# Patient Record
(demographics unavailable — no encounter records)

---

## 2024-12-17 NOTE — PLAN
[TextEntry] : 1. Blood work will be placed in the system and emailed to Cristobal for completion. 2. Cristobal will decrease his protein intake to 65-70g/day in the first week of January prior to blood work being completed a few weeks later. 3. Ophthalmologist appointed to be scheduled by Cristobal for a dilated eye exam. 4. Cardiology appointment to be scheduled by Cristobal 5. Follow-up in 6 months

## 2024-12-17 NOTE — HISTORY OF PRESENT ILLNESS
[FreeTextEntry1] : Cristobal is a 27-year-old male with methylenetetrahydrofolate (MTHFR) deficiency, autonomic postural hypotension and a history of Paroxysmal atrial fibrillation presenting to the office for a follow-up visit. He was last seen by Dr. Saldivar on 12/20/2023.   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   MRI scan of the brain was normal, ruling out white matter disease occasionally described in late onset MTHFR deficiency.    Today: Cristobal is presenting for a follow-up visit.   Current: Cristobal reports that he has not had any medical emergencies or ED visits since last seen in 12/2023. He presented to a urgicenter about 1 month ago with sores and small abrasions to his tongue without knowledge of biting his tongue. He decreased the amount of time that he had been spending in the gym lately. Cristobal reports a high protein diet with red meat, chicken and fish. 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  Cristobal will try to decrease his protein amount and repeat the blood work to see if any changes are noted.  We also discussed visiting an opthalmalogist (dialated exam) and visiting the cardiologist again. Diet: Ben Weil, RD completed a diet recall at this visit  Med Hx: Cristobal was started on Betaine in 5/2016. He was seen for a 2nd opinion by a metabolic  at [facility], Dr. Tse. They started Cristobal on Vitamin B12 and folinic acid. Dr. Ragsdale told the parents that folinic acid is not effective in this condition and had the parents order 5-methyltetrahydrofolate and start Cristobal on 5 mg bid.     Current Medications: as of 12/20/23 -Aspirin 81mg - Betaine 6g BID - Multi vit QD   Homocysteine levels historically: Ref. range: <=15 umol/L  09/20/2021- 158.6  02/23/2022- 157.7  12/23/2023- 103.6  10/12/2024- 97.6   Methylmalonic Acid: 09/20/2021- 79nmol/L  (ref. range )  Specialist:  Cardiologist: Dr. Radha Vasquez, ([facility]), for atrial fibrillation. He had a stress test which was normal. about 2 years ago (2022) Ophthalmologist: examined for dry eyes without dilation 11/2024  Hospitalizations: 2/2016- Synchronized cardioversion was required to resolve his atrial fibrillation.  Dr. Saldivar encouraged questions and fully answered them.   She discussed the option of hydroxycobalamin injections and ways to put emergency medical information on Cristobal's phone.

## 2024-12-17 NOTE — REASON FOR VISIT
[Follow-Up] : [unfilled]  is being seen for  ~M a follow-up visit [Other: _____] : [unfilled] [FreeTextEntry3] : Cristobal is presenting for a follow-up for MTHFR deficiency

## 2024-12-17 NOTE — HISTORY OF PRESENT ILLNESS
[FreeTextEntry1] : Cristobal is a 27-year-old male with methylenetetrahydrofolate (MTHFR) deficiency, autonomic postural hypotension and a history of Paroxysmal atrial fibrillation presenting to the office for a follow-up visit. He was last seen by Dr. Saldivar on 12/20/2023.   Genetic Testing: The MTHFR gene sequencing and del/dup analysis showed that Cristobal is compound heterozygous for a pathogenic variant (c.1262G>T; p.[id number]) and a variant of uncertain significance (c.1099T>C; p.[id number]).   MRI scan of the brain was normal, ruling out white matter disease occasionally described in late onset MTHFR deficiency.    Today: Cristobal is presenting for a follow-up visit.   Current: Cristobal reports that he has not had any medical emergencies or ED visits since last seen in 12/2023. He presented to a urgicenter about 1 month ago with sores and small abrasions to his tongue without knowledge of biting his tongue. He decreased the amount of time that he had been spending in the gym lately. Cristobal reports a high protein diet with red meat, chicken and fish. 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  Cristobal will try to decrease his protein amount and repeat the blood work to see if any changes are noted.  We also discussed visiting an opthalmalogist (dialated exam) and visiting the cardiologist again. Diet: Ben Weil, RD completed a diet recall at this visit  Med Hx: Cristobal was started on Betaine in 5/2016. He was seen for a 2nd opinion by a metabolic  at [facility], Dr. Tse. They started Cristobal on Vitamin B12 and folinic acid. Dr. Ragsdale told the parents that folinic acid is not effective in this condition and had the parents order 5-methyltetrahydrofolate and start Cristobal on 5 mg bid.     Current Medications: as of 12/20/23 -Aspirin 81mg - Betaine 6g BID - Multi vit QD   Homocysteine levels historically: Ref. range: <=15 umol/L  09/20/2021- 158.6  02/23/2022- 157.7  12/23/2023- 103.6  10/12/2024- 97.6   Methylmalonic Acid: 09/20/2021- 79nmol/L  (ref. range )  Specialist:  Cardiologist: Dr. Radha Vasquez, ([facility]), for atrial fibrillation. He had a stress test which was normal. about 2 years ago (2022) Ophthalmologist: examined for dry eyes without dilation 11/2024  Hospitalizations: 2/2016- Synchronized cardioversion was required to resolve his atrial fibrillation.  Dr. Saldivar encouraged questions and fully answered them.   She discussed the option of hydroxycobalamin injections and ways to put emergency medical information on Cristobal's phone.

## 2025-06-11 NOTE — HISTORY OF PRESENT ILLNESS
[FreeTextEntry1] : Cristobal is a 28-year-old male with methylenetetrahydrofolate (MTHFR) deficiency, autonomic postural hypotension and a history of Paroxysmal atrial fibrillation presenting to the office for a follow-up visit. He was last seen by Dr. Saldivar on 12/11/2024.   Genetic Testing: The MTHFR gene sequencing and del/dup analysis showed that Cristobal is compound heterozygous for a pathogenic variant (c.1262G>T; p.[id number]) and a variant of uncertain significance (c.1099T>C; p.[id number]).   MRI scan of the brain was normal, ruling out white matter disease occasionally described in late onset MTHFR deficiency.    Today: Discussion of hydroxycobalamin injections at this visit.  Current: Cristobal reports: - No  medical emergencies or ED visits since last seen in 12/11/2024.  -increased headaches- moderate on 1 side of the head (left) and starting in the neck with movement toward the eye, lasting 3-8 hours, 1 episode with vomiting treated with Ibuprofen with some relief noted. More severe episode was relieved with sleep. Frequency about 1x/week. - mother has history of migraines - Mri completed of head ordered by cardiologist and reported as normal - unsure of the date ( 1 reported MRI in the system in 2016)  A strict low protein diet was started (65-70g protein) for 2 weeks was completed prior to recent labs. This diet did not result in a decreased homocysteine.  Cristobal has returned to a normal diet including protein. He remains on medications reported below.  [facility] is no longer supplying Betaine.  He has a 7 day supply on hand.  He also reports taking L- Methylfolate liquid (15 mg) BID.     Med Hx: Cristobal was started on Betaine in 5/2016. He was seen for a 2nd opinion by a metabolic  at [facility], Dr. Tse. They started Cristobal on Vitamin B12 and folinic acid. Dr. Ragsdale told the parents that folinic acid is not effective in this condition and had the parents order 5-methyltetrahydrofolate and start Cristobal on 5 mg bid.     Current Medications: as of 12/20/23 -Aspirin 81mg - Betaine 6g BID-  - Multi vit QD  -L- Methylfolate liquid (15 mg) BID. ( added 2025)   Homocysteine levels historically: Ref. range: <=15 umol/L 09/20/2021- 158.6  02/23/2022- 157.7  12/23/2023- 103.6  10/12/2024- 97.6  2/9/2025- 139.3 (while on lower protein diet)  Methylmalonic Acid: 09/20/2021- 79nmol/L  (ref. range )  Specialist:  Cardiologist: Dr. Radha Vasquez, ([facility]), for atrial fibrillation. He had a stress test which was normal. about 2 years ago (2022) Ophthalmologist: examined for dry eyes without dilation 11/2024  Hospitalizations: 2/2016- Synchronized cardioversion was required to resolve his atrial fibrillation.

## 2025-06-11 NOTE — HISTORY OF PRESENT ILLNESS
[FreeTextEntry1] : Cristobal is a 28-year-old male with methylenetetrahydrofolate (MTHFR) deficiency, autonomic postural hypotension and a history of Paroxysmal atrial fibrillation presenting to the office for a follow-up visit. He was last seen by Dr. Saldivar on 12/11/2024.   Genetic Testing: The MTHFR gene sequencing and del/dup analysis showed that Cristobal is compound heterozygous for a pathogenic variant (c.1262G>T; p.[id number]) and a variant of uncertain significance (c.1099T>C; p.[id number]).   MRI scan of the brain was normal, ruling out white matter disease occasionally described in late onset MTHFR deficiency.    Today: Discussion of hydroxycobalamin injections at this visit.  Current: Cristobal reports: - No  medical emergencies or ED visits since last seen in 12/11/2024.  -increased headaches- moderate on 1 side of the head (left) and starting in the neck with movement toward the eye, lasting 3-8 hours, 1 episode with vomiting treated with Ibuprofen with some relief noted. More severe episode was relieved with sleep. Frequency about 1x/week. - mother has history of migraines - Mri completed of head ordered by cardiologist and reported as normal - unsure of the date ( 1 reported MRI in the system in 2016)  A strict low protein diet was started (65-70g protein) for 2 weeks was completed prior to recent labs. This diet did not result in a decreased homocysteine.  Cristobal has returned to a normal diet including protein. He remains on medications reported below.  [facility] is no longer supplying Betaine.  He has a 7 day supply on hand.  He also reports taking L- Methylfolate liquid (15 mg) BID.     Med Hx: Cristobal was started on Betaine in 5/2016. He was seen for a 2nd opinion by a metabolic  at [facility], Dr. Tse. They started Cristobal on Vitamin B12 and folinic acid. Dr. Ragsdale told the parents that folinic acid is not effective in this condition and had the parents order 5-methyltetrahydrofolate and start Cristobal on 5 mg bid.     Current Medications: as of 12/20/23 -Aspirin 81mg - Betaine 6g BID-  - Multi vit QD  -L- Methylfolate liquid (15 mg) BID. ( added 2025)   Homocysteine levels historically: Ref. range: <=15 umol/L 09/20/2021- 158.6  02/23/2022- 157.7  12/23/2023- 103.6  10/12/2024- 97.6  2/9/2025- 139.3 (while on lower protein diet)  Methylmalonic Acid: 09/20/2021- 79nmol/L  (ref. range )  Specialist:  Cardiologist: Dr. Radha Vasquez, ([facility]), for atrial fibrillation. He had a stress test which was normal. about 2 years ago (2022) Ophthalmologist: examined for dry eyes without dilation 11/2024  Hospitalizations: 2/2016- Synchronized cardioversion was required to resolve his atrial fibrillation.

## 2025-06-11 NOTE — PLAN
[TextEntry] : 1. Blood work to be completed at this visit: RAFAEL, Homocysteine, serum and Vitamin B12, serum. 2.Hydroxyocobalamin Acetate 1000 mcg/ml IM solution 1 ml daily- ordered at this visit- Cristobal will re-establish a connection with his PCP and complete the first injection with his PCP. He will notify this office of his first injection and we will send a script for blood work to be completed 2 weeks after the first injection. 3. This office will send a script for Betaine to a different pharmacy- need to confirm insurance information 4.Recommended to follow-up with ophthalmologist for a dilated eye exam 5. MRA and MRV head with contrast and MRA and MRV Neck with contrast ordered- will email the script to Cristobal on 6/5/25.- need to confirm insurance information 6 Follow-up to be scheduled after reviewing blood work

## 2025-06-11 NOTE — HISTORY OF PRESENT ILLNESS
[FreeTextEntry1] : Cristobal is a 28-year-old male with methylenetetrahydrofolate (MTHFR) deficiency, autonomic postural hypotension and a history of Paroxysmal atrial fibrillation presenting to the office for a follow-up visit. He was last seen by Dr. Saldivar on 12/11/2024.   Genetic Testing: The MTHFR gene sequencing and del/dup analysis showed that Cristobal is compound heterozygous for a pathogenic variant (c.1262G>T; p.[id number]) and a variant of uncertain significance (c.1099T>C; p.[id number]).   MRI scan of the brain was normal, ruling out white matter disease occasionally described in late onset MTHFR deficiency.    Today: Discussion of hydroxycobalamin injections at this visit.  Current: Cristobal reports: - No  medical emergencies or ED visits since last seen in 12/11/2024.  -increased headaches- moderate on 1 side of the head (left) and starting in the neck with movement toward the eye, lasting 3-8 hours, 1 episode with vomiting treated with Ibuprofen with some relief noted. More severe episode was relieved with sleep. Frequency about 1x/week. - mother has history of migraines - Mri completed of head ordered by cardiologist and reported as normal - unsure of the date ( 1 reported MRI in the system in 2016)  A strict low protein diet was started (65-70g protein) for 2 weeks was completed prior to recent labs. This diet did not result in a decreased homocysteine.  Cristobal has returned to a normal diet including protein. He remains on medications reported below.  [facility] is no longer supplying Betaine.  He has a 7 day supply on hand.  He also reports taking L- Methylfolate liquid (15 mg) BID.     Med Hx: Cristobal was started on Betaine in 5/2016. He was seen for a 2nd opinion by a metabolic  at [facility], Dr. Tse. They started Cristobal on Vitamin B12 and folinic acid. Dr. Ragsdale told the parents that folinic acid is not effective in this condition and had the parents order 5-methyltetrahydrofolate and start Cristobal on 5 mg bid.     Current Medications: as of 12/20/23 -Aspirin 81mg - Betaine 6g BID-  - Multi vit QD  -L- Methylfolate liquid (15 mg) BID. ( added 2025)   Homocysteine levels historically: Ref. range: <=15 umol/L 09/20/2021- 158.6  02/23/2022- 157.7  12/23/2023- 103.6  10/12/2024- 97.6  2/9/2025- 139.3 (while on lower protein diet)  Methylmalonic Acid: 09/20/2021- 79nmol/L  (ref. range )  Specialist:  Cardiologist: Dr. Radha Vasquez, ([facility]), for atrial fibrillation. He had a stress test which was normal. about 2 years ago (2022) Ophthalmologist: examined for dry eyes without dilation 11/2024  Hospitalizations: 2/2016- Synchronized cardioversion was required to resolve his atrial fibrillation.

## 2025-06-11 NOTE — PHYSICAL EXAM
[Normal] : no rash, no stigmata of neurocutaneous disease [Cranial Nerves] : cranial nerves are normal [Muscle tone/ strength] : muscle tone/ strength is normal [Moving all four extremities symmetrically] : moving all four extremities symmetrically

## 2025-06-11 NOTE — HISTORY OF PRESENT ILLNESS
[FreeTextEntry1] : Cristobal is a 28-year-old male with methylenetetrahydrofolate (MTHFR) deficiency, autonomic postural hypotension and a history of Paroxysmal atrial fibrillation presenting to the office for a follow-up visit. He was last seen by Dr. Saldivar on 12/11/2024.   Genetic Testing: The MTHFR gene sequencing and del/dup analysis showed that Cristobal is compound heterozygous for a pathogenic variant (c.1262G>T; p.[id number]) and a variant of uncertain significance (c.1099T>C; p.[id number]).   MRI scan of the brain was normal, ruling out white matter disease occasionally described in late onset MTHFR deficiency.    Today: Discussion of hydroxycobalamin injections at this visit.  Current: Cristobal reports: - No  medical emergencies or ED visits since last seen in 12/11/2024.  -increased headaches- moderate on 1 side of the head (left) and starting in the neck with movement toward the eye, lasting 3-8 hours, 1 episode with vomiting treated with Ibuprofen with some relief noted. More severe episode was relieved with sleep. Frequency about 1x/week. - mother has history of migraines - Mri completed of head ordered by cardiologist and reported as normal - unsure of the date ( 1 reported MRI in the system in 2016)  A strict low protein diet was started (65-70g protein) for 2 weeks was completed prior to recent labs. This diet did not result in a decreased homocysteine.  Cristobal has returned to a normal diet including protein. He remains on medications reported below.  [facility] is no longer supplying Betaine.  He has a 7 day supply on hand.  He also reports taking L- Methylfolate liquid (15 mg) BID.     Med Hx: Cristobal was started on Betaine in 5/2016. He was seen for a 2nd opinion by a metabolic  at [facility], Dr. Tse. They started Cristobal on Vitamin B12 and folinic acid. Dr. Ragsdale told the parents that folinic acid is not effective in this condition and had the parents order 5-methyltetrahydrofolate and start Cristobal on 5 mg bid.     Current Medications: as of 12/20/23 -Aspirin 81mg - Betaine 6g BID-  - Multi vit QD  -L- Methylfolate liquid (15 mg) BID. ( added 2025)   Homocysteine levels historically: Ref. range: <=15 umol/L 09/20/2021- 158.6  02/23/2022- 157.7  12/23/2023- 103.6  10/12/2024- 97.6  2/9/2025- 139.3 (while on lower protein diet)  Methylmalonic Acid: 09/20/2021- 79nmol/L  (ref. range )  Specialist:  Cardiologist: Dr. Radha Vasquez, ([facility]), for atrial fibrillation. He had a stress test which was normal. about 2 years ago (2022) Ophthalmologist: examined for dry eyes without dilation 11/2024  Hospitalizations: 2/2016- Synchronized cardioversion was required to resolve his atrial fibrillation.